# Patient Record
Sex: MALE | Race: WHITE | NOT HISPANIC OR LATINO | Employment: OTHER | URBAN - NONMETROPOLITAN AREA
[De-identification: names, ages, dates, MRNs, and addresses within clinical notes are randomized per-mention and may not be internally consistent; named-entity substitution may affect disease eponyms.]

---

## 2017-11-06 ENCOUNTER — OFFICE VISIT (OUTPATIENT)
Dept: CARDIOLOGY | Facility: CLINIC | Age: 57
End: 2017-11-06

## 2017-11-06 ENCOUNTER — HOSPITAL ENCOUNTER (OUTPATIENT)
Dept: GENERAL RADIOLOGY | Facility: HOSPITAL | Age: 57
Discharge: HOME | End: 2017-11-06
Attending: INTERNAL MEDICINE | Admitting: INTERNAL MEDICINE

## 2017-11-06 ENCOUNTER — APPOINTMENT (OUTPATIENT)
Dept: LAB | Facility: HOSPITAL | Age: 57
End: 2017-11-06

## 2017-11-06 VITALS
OXYGEN SATURATION: 98 % | DIASTOLIC BLOOD PRESSURE: 84 MMHG | WEIGHT: 149 LBS | HEART RATE: 64 BPM | SYSTOLIC BLOOD PRESSURE: 108 MMHG | HEIGHT: 70 IN | BODY MASS INDEX: 21.33 KG/M2

## 2017-11-06 DIAGNOSIS — I51.7 CARDIOMEGALY: ICD-10-CM

## 2017-11-06 DIAGNOSIS — R79.9 ABNORMAL FINDING OF BLOOD CHEMISTRY: ICD-10-CM

## 2017-11-06 DIAGNOSIS — I48.0 PAROXYSMAL ATRIAL FIBRILLATION (HCC): Primary | ICD-10-CM

## 2017-11-06 DIAGNOSIS — R07.2 PRECORDIAL PAIN: ICD-10-CM

## 2017-11-06 DIAGNOSIS — R06.09 DYSPNEA ON EXERTION: ICD-10-CM

## 2017-11-06 DIAGNOSIS — Z13.1 DIABETES MELLITUS SCREENING: ICD-10-CM

## 2017-11-06 DIAGNOSIS — R09.89 BRUIT: ICD-10-CM

## 2017-11-06 DIAGNOSIS — Z13.220 SCREENING FOR HYPERLIPIDEMIA: ICD-10-CM

## 2017-11-06 DIAGNOSIS — Z86.79 PERSONAL HISTORY OF ATRIAL FIBRILLATION: Primary | ICD-10-CM

## 2017-11-06 LAB
D-DIMER, QUANTITATIVE (MAD,POW, STR): 1130 NG/ML (FEU) (ref 0–470)
DEPRECATED RDW RBC AUTO: 41.5 FL (ref 35.1–43.9)
ERYTHROCYTE [DISTWIDTH] IN BLOOD BY AUTOMATED COUNT: 14.3 % (ref 11.5–14.5)
HBA1C MFR BLD: 5.7 % (ref 4–5.6)
HCT VFR BLD AUTO: 47 % (ref 39–49)
HGB BLD-MCNC: 15.5 G/DL (ref 13.7–17.3)
MCH RBC QN AUTO: 26.4 PG (ref 26.5–34)
MCHC RBC AUTO-ENTMCNC: 33 G/DL (ref 31.5–36.3)
MCV RBC AUTO: 80.1 FL (ref 80–98)
NT-PROBNP SERPL-MCNC: 322 PG/ML (ref 0–900)
PLATELET # BLD AUTO: 193 10*3/MM3 (ref 150–450)
PMV BLD AUTO: 9.1 FL (ref 8–12)
RBC # BLD AUTO: 5.87 10*6/MM3 (ref 4.37–5.74)
WBC NRBC COR # BLD: 6.43 10*3/MM3 (ref 3.2–9.8)

## 2017-11-06 PROCEDURE — 99202 OFFICE O/P NEW SF 15 MIN: CPT | Performed by: INTERNAL MEDICINE

## 2017-11-06 PROCEDURE — 85379 FIBRIN DEGRADATION QUANT: CPT | Performed by: INTERNAL MEDICINE

## 2017-11-06 PROCEDURE — 71020 HC CHEST PA AND LATERAL: CPT

## 2017-11-06 PROCEDURE — 83036 HEMOGLOBIN GLYCOSYLATED A1C: CPT | Performed by: INTERNAL MEDICINE

## 2017-11-06 PROCEDURE — 85027 COMPLETE CBC AUTOMATED: CPT | Performed by: INTERNAL MEDICINE

## 2017-11-06 PROCEDURE — 93000 ELECTROCARDIOGRAM COMPLETE: CPT | Performed by: INTERNAL MEDICINE

## 2017-11-06 PROCEDURE — 36415 COLL VENOUS BLD VENIPUNCTURE: CPT | Performed by: INTERNAL MEDICINE

## 2017-11-06 PROCEDURE — 83880 ASSAY OF NATRIURETIC PEPTIDE: CPT | Performed by: INTERNAL MEDICINE

## 2017-11-06 NOTE — PATIENT INSTRUCTIONS
Pharmacologic Stress Electrocardiogram  A pharmacologic stress electrocardiogram is a heart (cardiac) test that uses nuclear imaging to evaluate the blood supply to your heart. This test may also be called a pharmacologic stress electrocardiography. Pharmacologic means that a medicine is used to increase your heart rate and blood pressure.   This stress test is done to find areas of poor blood flow to the heart by determining the extent of coronary artery disease (CAD). Some people exercise on a treadmill, which naturally increases the blood flow to the heart. For those people unable to exercise on a treadmill, a medicine is used. This medicine stimulates your heart and will cause your heart to beat harder and more quickly, as if you were exercising.   Pharmacologic stress tests can help determine:  · The adequacy of blood flow to your heart during increased levels of activity in order to clear you for discharge home.  · The extent of coronary artery blockage caused by CAD.  · Your prognosis if you have suffered a heart attack.  · The effectiveness of cardiac procedures done, such as an angioplasty, which can increase the circulation in your coronary arteries.  · Causes of chest pain or pressure.  LET YOUR HEALTH CARE PROVIDER KNOW ABOUT:  · Any allergies you have.  · All medicines you are taking, including vitamins, herbs, eye drops, creams, and over-the-counter medicines.  · Previous problems you or members of your family have had with the use of anesthetics.  · Any blood disorders you have.  · Previous surgeries you have had.  · Medical conditions you have.  · Possibility of pregnancy, if this applies.  · If you are currently breastfeeding.  RISKS AND COMPLICATIONS  Generally, this is a safe procedure. However, as with any procedure, complications can occur. Possible complications include:  · You develop pain or pressure in the following areas:    Chest.    Jaw or neck.    Between your shoulder blades.     Radiating down your left arm.  · Headache.  · Dizziness or light-headedness.  · Shortness of breath.  · Increased or irregular heartbeat.  · Low blood pressure.  · Nausea or vomiting.  · Flushing.  · Redness going up the arm and slight pain during injection of medicine.  · Heart attack (rare).  BEFORE THE PROCEDURE   · Avoid all forms of caffeine for 24 hours before your test or as directed by your health care provider. This includes coffee, tea (even decaffeinated tea), caffeinated sodas, chocolate, cocoa, and certain pain medicines.  · Follow your health care provider's instructions regarding eating and drinking before the test.  · Take your medicines as directed at regular times with water unless instructed otherwise. Exceptions may include:    If you have diabetes, ask how you are to take your insulin or pills. It is common to adjust insulin dosing the morning of the test.    If you are taking beta-blocker medicines, it is important to talk to your health care provider about these medicines well before the date of your test. Taking beta-blocker medicines may interfere with the test. In some cases, these medicines need to be changed or stopped 24 hours or more before the test.    If you wear a nitroglycerin patch, it may need to be removed prior to the test. Ask your health care provider if the patch should be removed before the test.    If you use an inhaler for any breathing condition, bring it with you to the test.  · If you are an outpatient, bring a snack so you can eat right after the stress phase of the test.  · Do not smoke for 4 hours prior to the test or as directed by your health care provider.  · Do not apply lotions, powders, creams, or oils on your chest prior to the test.  · Wear comfortable shoes and clothing.  Let your health care provider know if you were unable to complete or follow the preparations for your test.  PROCEDURE   · Multiple patches (electrodes) will be put on your chest. If  needed, small areas of your chest may be shaved to get better contact with the electrodes. Once the electrodes are attached to your body, multiple wires will be attached to the electrodes, and your heart rate will be monitored.  · An IV access will be started. A nuclear trace (isotope) is given. The isotope may be given intravenously, or it may be swallowed. Nuclear refers to several types of radioactive isotopes, and the nuclear isotope lights up the arteries so that the nuclear images are clear. The isotope is absorbed by your body. This results in low radiation exposure.  · A resting nuclear image is taken to show how your heart functions at rest.  · A medicine is given through the IV access.  · A second scan is done about 1 hour after the medicine injection and determines how your heart functions under stress.  · During this stress phase, you will be connected to an electrocardiogram machine. Your blood pressure and oxygen levels will be monitored.  AFTER THE PROCEDURE   · Your heart rate and blood pressure will be monitored after the test.  · You may return to your normal schedule, including diet, activities, and medicines, unless your health care provider tells you otherwise.     This information is not intended to replace advice given to you by your health care provider. Make sure you discuss any questions you have with your health care provider.     Document Released: 05/06/2010 Document Revised: 12/23/2014 Document Reviewed: 08/25/2014  [employer] Interactive Patient Education ©2017 [employer]  Warfarin tablets  What is this medicine?  WARFARIN (WAR far in) is an anticoagulant. It is used to treat or prevent clots in the veins, arteries, lungs, or heart.  This medicine may be used for other purposes; ask your health care provider or pharmacist if you have questions.  COMMON BRAND NAME(S): Coumadin, Jantoven  What should I tell my health care provider before I take this medicine?  They need to know if you  have any of these conditions:  -alcoholism  -anemia  -bleeding disorders  -cancer  -diabetes  -heart disease  -high blood pressure  -history of bleeding in the gastrointestinal tract  -history of stroke or other brain injury or disease  -kidney or liver disease  -protein C deficiency  -protein S deficiency  -psychosis or dementia  -recent injury, recent or planned surgery or procedure  -an unusual or allergic reaction to warfarin, other medicines, foods, dyes, or preservatives  -pregnant or trying to get pregnant  -breast-feeding  How should I use this medicine?  Take this medicine by mouth with a glass of water. Follow the directions on the prescription label. You can take this medicine with or without food. Take your medicine at the same time each day. Do not take it more often than directed. Do not stop taking except on your doctor's advice. Stopping this medicine may increase your risk of a blood clot. Be sure to refill your prescription before you run out of medicine.  If your doctor or healthcare professional calls to change your dose, write down the dose and any other instructions. Always read the dose and instructions back to him or her to make sure you understand them. Tell your doctor or healthcare professional what strength of tablets you have on hand. Ask how many tablets you should take to equal your new dose. Write the date on the new instructions and keep them near your medicine. If you are told to stop taking your medicine until your next blood test, call your doctor or healthcare professional if you do not hear anything within 24 hours of the test to find out your new dose or when to restart your prior dose.  A special MedGuide will be given to you by the pharmacist with each prescription and refill. Be sure to read this information carefully each time.  Talk to your pediatrician regarding the use of this medicine in children. Special care may be needed.  Overdosage: If you think you have taken too  much of this medicine contact a poison control center or emergency room at once.  NOTE: This medicine is only for you. Do not share this medicine with others.  What if I miss a dose?  It is important not to miss a dose. If you miss a dose, call your healthcare provider. Take the dose as soon as possible on the same day. If it is almost time for your next dose, take only that dose. Do not take double or extra doses to make up for a missed dose.  What may interact with this medicine?  Do not take this medicine with any of the following medications:  -agents that prevent or dissolve blood clots  -aspirin or other salicylates  -danshen  -dextrothyroxine  -mifepristone  -Guy's Wort  -red yeast rice  This medicine may also interact with the following medications:  -acetaminophen  -agents that lower cholesterol  -alcohol  -allopurinol  -amiodarone  -antibiotics or medicines for treating bacterial, fungal or viral infections  -azathioprine  -barbiturate medicines for inducing sleep or treating seizures  -certain medicines for diabetes  -certain medicines for heart rhythm problems  -certain medicines for high blood pressure  -chloral hydrate  -cisapride  -disulfiram  -female hormones, including contraceptive or birth control pills  -general anesthetics  -herbal or dietary products like garlic, ginkgo, ginseng, green tea, or kava kava  -influenza virus vaccine  -male hormones  -medicines for mental depression or psychosis  -medicines for some types of cancer  -medicines for stomach problems  -methylphenidate  -NSAIDs, medicines for pain and inflammation, like ibuprofen or naproxen  -propoxyphene  -quinidine, quinine  -raloxifene  -seizure or epilepsy medicine like carbamazepine, phenytoin, and valproic acid  -steroids like cortisone and prednisone  -tamoxifen  -thyroid medicine  -tramadol  -vitamin c, vitamin e, and vitamin K  -zafirlukast  -zileuton  This list may not describe all possible interactions. Give your health  care provider a list of all the medicines, herbs, non-prescription drugs, or dietary supplements you use. Also tell them if you smoke, drink alcohol, or use illegal drugs. Some items may interact with your medicine.  What should I watch for while using this medicine?  Visit your doctor or health care professional for regular checks on your progress. You will need to have a blood test called a PT/INR regularly. The PT/INR blood test is done to make sure you are getting the right dose of this medicine. It is important to not miss your appointment for the blood tests. When you first start taking this medicine, these tests are done often. Once the correct dose is determined and you take your medicine properly, these tests can be done less often.  Notify your doctor or health care professional and seek emergency treatment if you develop breathing problems; changes in vision; chest pain; severe, sudden headache; pain, swelling, warmth in the leg; trouble speaking; sudden numbness or weakness of the face, arm or leg. These can be signs that your condition has gotten worse.  While you are taking this medicine, carry an identification card with your name, the name and dose of medicine(s) being used, and the name and phone number of your doctor or health care professional or person to contact in an emergency.  Do not start taking or stop taking any medicines or over-the-counter medicines except on the advice of your doctor or health care professional.  You should discuss your diet with your doctor or health care professional. Do not make major changes in your diet. Vitamin K can affect how well this medicine works. Many foods contain vitamin K. It is important to eat a consistent amount of foods with vitamin K. Other foods with vitamin K that you should eat in consistent amounts are asparagus, basil, beef or pork liver, black eyed peas, broccoli, brussel sprouts, cabbage, chickpeas, cucumber with peel, green onions, green  tea, okra, parsley, peas, thyme, and green leafy vegetables like beet greens, lyndon greens, endive, kale, mustard greens, spinach, turnip greens, watercress, or certain lettuces like green leaf or todd.  This medicine can cause birth defects or bleeding in an unborn child. Women of childbearing age should use effective birth control while taking this medicine. If a woman becomes pregnant while taking this medicine, she should discuss the potential risks and her options with her health care professional.  Avoid sports and activities that might cause injury while you are using this medicine. Severe falls or injuries can cause unseen bleeding. Be careful when using sharp tools or knives. Consider using an electric razor. Take special care brushing or flossing your teeth. Report any injuries, bruising, or red spots on the skin to your doctor or health care professional.  If you have an illness that causes vomiting, diarrhea, or fever for more than a few days, contact your doctor. Also check with your doctor if you are unable to eat for several days. These problems can change the effect of this medicine.  Even after you stop taking this medicine, it takes several days before your body recovers its normal ability to clot blood. Ask your doctor or health care professional how long you need to be careful. If you are going to have surgery or dental work, tell your doctor or health care professional that you have been taking this medicine.  What side effects may I notice from receiving this medicine?  Side effects that you should report to your doctor or health care professional as soon as possible:  -allergic reactions like skin rash, itching or hives, swelling of the face, lips, or tongue  -breathing problems  -chest pain  -dizziness  -headache  -heavy menstrual bleeding or vaginal bleeding  -pain in the lower back or side  -painful, blue or purple toes  -painful skin ulcers that do not go away  -signs and symptoms of  bleeding such as bloody or black, tarry stools; red or dark-brown urine; spitting up blood or brown material that looks like coffee grounds; red spots on the skin; unusual bruising or bleeding from the eye, gums, or nose  -stomach pain  -unusually weak or tired  Side effects that usually do not require medical attention (report to your doctor or health care professional if they continue or are bothersome):  -diarrhea  -hair loss  This list may not describe all possible side effects. Call your doctor for medical advice about side effects. You may report side effects to FDA at 8-060-NCT-1643.  Where should I keep my medicine?  Keep out of the reach of children.  Store at room temperature between 15 and 30 degrees C (59 and 86 degrees F). Protect from light. Throw away any unused medicine after the expiration date. Do not flush down the toilet.  NOTE: This sheet is a summary. It may not cover all possible information. If you have questions about this medicine, talk to your doctor, pharmacist, or health care provider.     © 2017, [employer]. (2016-10-18 14:36:10)  Atrial Fibrillation  Atrial fibrillation is a type of irregular or rapid heartbeat (arrhythmia). In atrial fibrillation, the heart quivers continuously in a chaotic pattern. This occurs when parts of the heart receive disorganized signals that make the heart unable to pump blood normally. This can increase the risk for stroke, heart failure, and other heart-related conditions. There are different types of atrial fibrillation, including:  · Paroxysmal atrial fibrillation. This type starts suddenly, and it usually stops on its own shortly after it starts.  · Persistent atrial fibrillation. This type often lasts longer than a week. It may stop on its own or with treatment.  · Long-lasting persistent atrial fibrillation. This type lasts longer than 12 months.  · Permanent atrial fibrillation. This type does not go away.  Talk with your health care  provider to learn about the type of atrial fibrillation that you have.  CAUSES  This condition is caused by some heart-related conditions or procedures, including:  · A heart attack.  · Coronary artery disease.  · Heart failure.  · Heart valve conditions.  · High blood pressure.  · Inflammation of the sac that surrounds the heart (pericarditis).  · Heart surgery.  · Certain heart rhythm disorders, such as Wise-Parkinson-White syndrome.  Other causes include:  · Pneumonia.  · Obstructive sleep apnea.  · Blockage of an artery in the lungs (pulmonary embolism, or PE).  · Lung cancer.  · Chronic lung disease.  · Thyroid problems, especially if the thyroid is overactive (hyperthyroidism).  · Caffeine.  · Excessive alcohol use or illegal drug use.  · Use of some medicines, including certain decongestants and diet pills.  Sometimes, the cause cannot be found.  RISK FACTORS  This condition is more likely to develop in:  · People who are older in age.  · People who smoke.  · People who have diabetes mellitus.  · People who are overweight (obese).  · Athletes who exercise vigorously.  SYMPTOMS  Symptoms of this condition include:  · A feeling that your heart is beating rapidly or irregularly.  · A feeling of discomfort or pain in your chest.  · Shortness of breath.  · Sudden light-headedness or weakness.  · Getting tired easily during exercise.  In some cases, there are no symptoms.  DIAGNOSIS  Your health care provider may be able to detect atrial fibrillation when taking your pulse. If detected, this condition may be diagnosed with:  · An electrocardiogram (ECG).  · A Holter monitor test that records your heartbeat patterns over a 24-hour period.  · Transthoracic echocardiogram (TTE) to evaluate how blood flows through your heart.  · Transesophageal echocardiogram (AVRIL) to view more detailed images of your heart.  · A stress test.  · Imaging tests, such as a CT scan or chest X-ray.  · Blood tests.  TREATMENT  The main goals  of treatment are to prevent blood clots from forming and to keep your heart beating at a normal rate and rhythm. The type of treatment that you receive depends on many factors, such as your underlying medical conditions and how you feel when you are experiencing atrial fibrillation.  This condition may be treated with:  · Medicine to slow down the heart rate, bring the heart's rhythm back to normal, or prevent clots from forming.  · Electrical cardioversion. This is a procedure that resets your heart's rhythm by delivering a controlled, low-energy shock to the heart through your skin.  · Different types of ablation, such as catheter ablation, catheter ablation with pacemaker, or surgical ablation. These procedures destroy the heart tissues that send abnormal signals. When the pacemaker is used, it is placed under your skin to help your heart beat in a regular rhythm.  HOME CARE INSTRUCTIONS  · Take over-the counter and prescription medicines only as told by your health care provider.  · If your health care provider prescribed a blood-thinning medicine (anticoagulant), take it exactly as told. Taking too much blood-thinning medicine can cause bleeding. If you do not take enough blood-thinning medicine, you will not have the protection that you need against stroke and other problems.  · Do not use tobacco products, including cigarettes, chewing tobacco, and e-cigarettes. If you need help quitting, ask your health care provider.  · If you have obstructive sleep apnea, manage your condition as told by your health care provider.  · Do not drink alcohol.  · Do not drink beverages that contain caffeine, such as coffee, soda, and tea.  · Maintain a healthy weight. Do not use diet pills unless your health care provider approves. Diet pills may make heart problems worse.  · Follow diet instructions as told by your health care provider.  · Exercise regularly as told by your health care provider.  · Keep all follow-up visits as  told by your health care provider. This is important.  PREVENTION  · Avoid drinking beverages that contain caffeine or alcohol.  · Avoid certain medicines, especially medicines that are used for breathing problems.  · Avoid certain herbs and herbal medicines, such as those that contain ephedra or ginseng.  · Do not use illegal drugs, such as cocaine and amphetamines.  · Do not smoke.  · Manage your high blood pressure.  SEEK MEDICAL CARE IF:  · You notice a change in the rate, rhythm, or strength of your heartbeat.  · You are taking an anticoagulant and you notice increased bruising.  · You tire more easily when you exercise or exert yourself.  SEEK IMMEDIATE MEDICAL CARE IF:  · You have chest pain, abdominal pain, sweating, or weakness.  · You feel nauseous.  · You notice blood in your vomit, bowel movement, or urine.  · You have shortness of breath.  · You suddenly have swollen feet and ankles.  · You feel dizzy.  · You have sudden weakness or numbness of the face, arm, or leg, especially on one side of the body.  · You have trouble speaking, trouble understanding, or both (aphasia).  · Your face or your eyelid droops on one side.  These symptoms may represent a serious problem that is an emergency. Do not wait to see if the symptoms will go away. Get medical help right away. Call your local emergency services (911 in the [place] Do not drive yourself to the hospital.     This information is not intended to replace advice given to you by your health care provider. Make sure you discuss any questions you have with your health care provider.     Document Released: 12/18/2006 Document Revised: 09/07/2016 Document Reviewed: 04/13/2016  [employer] Interactive Patient Education ©2017 [employer]  Steps to Quit Smoking   Smoking tobacco can be harmful to your health and can affect almost every organ in your body. Smoking puts you, and those around you, at risk for developing many serious chronic diseases. Quitting smoking  is difficult, but it is one of the best things that you can do for your health. It is never too late to quit.  WHAT ARE THE BENEFITS OF QUITTING SMOKING?  When you quit smoking, you lower your risk of developing serious diseases and conditions, such as:  · Lung cancer or lung disease, such as COPD.  · Heart disease.  · Stroke.  · Heart attack.  · Infertility.  · Osteoporosis and bone fractures.  Additionally, symptoms such as coughing, wheezing, and shortness of breath may get better when you quit. You may also find that you get sick less often because your body is stronger at fighting off colds and infections. If you are pregnant, quitting smoking can help to reduce your chances of having a baby of low birth weight.  HOW DO I GET READY TO QUIT?  When you decide to quit smoking, create a plan to make sure that you are successful. Before you quit:  · Pick a date to quit. Set a date within the next two weeks to give you time to prepare.  · Write down the reasons why you are quitting. Keep this list in places where you will see it often, such as on your bathroom mirror or in your car or wallet.  · Identify the people, places, things, and activities that make you want to smoke (triggers) and avoid them. Make sure to take these actions:    Throw away all cigarettes at home, at work, and in your car.    Throw away smoking accessories, such as ashtrays and lighters.    Clean your car and make sure to empty the ashtray.    Clean your home, including curtains and carpets.  · Tell your family, friends, and coworkers that you are quitting. Support from your loved ones can make quitting easier.  · Talk with your health care provider about your options for quitting smoking.  · Find out what treatment options are covered by your health insurance.  WHAT STRATEGIES CAN I USE TO QUIT SMOKING?   Talk with your healthcare provider about different strategies to quit smoking. Some strategies include:  · Quitting smoking altogether  "instead of gradually lessening how much you smoke over a period of time. Research shows that quitting \"cold turkey\" is more successful than gradually quitting.  · Attending in-person counseling to help you build problem-solving skills. You are more likely to have success in quitting if you attend several counseling sessions. Even short sessions of 10 minutes can be effective.  · Finding resources and support systems that can help you to quit smoking and remain smoke-free after you quit. These resources are most helpful when you use them often. They can include:    Online chats with a counselor.    Telephone quitlines.    Printed self-help materials.    Support groups or group counseling.    Text messaging programs.    Mobile phone applications.  · Taking medicines to help you quit smoking. (If you are pregnant or breastfeeding, talk with your health care provider first.) Some medicines contain nicotine and some do not. Both types of medicines help with cravings, but the medicines that include nicotine help to relieve withdrawal symptoms. Your health care provider may recommend:    Nicotine patches, gum, or lozenges.    Nicotine inhalers or sprays.    Non-nicotine medicine that is taken by mouth.  Talk with your health care provider about combining strategies, such as taking medicines while you are also receiving in-person counseling. Using these two strategies together makes you more likely to succeed in quitting than if you used either strategy on its own.  If you are pregnant or breastfeeding, talk with your health care provider about finding counseling or other support strategies to quit smoking. Do not take medicine to help you quit smoking unless told to do so by your health care provider.  WHAT THINGS CAN I DO TO MAKE IT EASIER TO QUIT?  Quitting smoking might feel overwhelming at first, but there is a lot that you can do to make it easier. Take these important actions:  · Reach out to your family and friends " and ask that they support and encourage you during this time. Call telephone quitlines, reach out to support groups, or work with a counselor for support.  · Ask people who smoke to avoid smoking around you.  · Avoid places that trigger you to smoke, such as bars, parties, or smoke-break areas at work.  · Spend time around people who do not smoke.  · Lessen stress in your life, because stress can be a smoking trigger for some people. To lessen stress, try:    Exercising regularly.    Deep-breathing exercises.    Yoga.    Meditating.    Performing a body scan. This involves closing your eyes, scanning your body from head to toe, and noticing which parts of your body are particularly tense. Purposefully relax the muscles in those areas.  · Download or purchase mobile phone or tablet apps (applications) that can help you stick to your quit plan by providing reminders, tips, and encouragement. There are many free apps, such as QuitGuide from the CDC (Centers for Disease Control and Prevention). You can find other support for quitting smoking (smoking cessation) through smokefree.gov and other websites.  HOW WILL I FEEL WHEN I QUIT SMOKING?  Within the first 24 hours of quitting smoking, you may start to feel some withdrawal symptoms. These symptoms are usually most noticeable 2-3 days after quitting, but they usually do not last beyond 2-3 weeks. Changes or symptoms that you might experience include:  · Mood swings.  · Restlessness, anxiety, or irritation.  · Difficulty concentrating.  · Dizziness.  · Strong cravings for sugary foods in addition to nicotine.  · Mild weight gain.  · Constipation.  · Nausea.  · Coughing or a sore throat.  · Changes in how your medicines work in your body.  · A depressed mood.  · Difficulty sleeping (insomnia).  After the first 2-3 weeks of quitting, you may start to notice more positive results, such as:  · Improved sense of smell and taste.  · Decreased coughing and sore  throat.  · Slower heart rate.  · Lower blood pressure.  · Clearer skin.  · The ability to breathe more easily.  · Fewer sick days.  Quitting smoking is very challenging for most people. Do not get discouraged if you are not successful the first time. Some people need to make many attempts to quit before they achieve long-term success. Do your best to stick to your quit plan, and talk with your health care provider if you have any questions or concerns.     This information is not intended to replace advice given to you by your health care provider. Make sure you discuss any questions you have with your health care provider.     Document Released: 12/12/2002 Document Revised: 05/03/2016 Document Reviewed: 05/03/2016  [employer] Interactive Patient Education ©2017 [employer]

## 2017-11-06 NOTE — PROGRESS NOTES
"   Cardiovascular Medicine      Juan Pablo Samuels M.D., Ph.D., [facility]         Thank you for asking me to see Antonio Trinidad for AF.    History of Present Illness  This is a 56 y.o. male with:    1. Presumed NVAF  A. Off anticoagulation  2. Prior h/o VTE: PE  A. July 2016  3. Smoker  4. Possible cardiomegaly  5. RV dilation    PE  He was diagnosed with a PE in July of 2016. This was at [facility] and then he was transferred to [place]. He was started on Warfarin. He was told he would need anticoagulation for \"life.\" He denies being told he had a bleeding disorder.     AF  The patient presents as a consult for atrial fibrillation.  Of note, he has seen cardiology in the past at another hospital.  He does not have his records.  He denies being told that he has valvular heart disease.  He denies prior CVA or TIA.  He apparently was on Coumadin in the past, but he ran out.  He never sought refills.   He denies increased cardiac awareness.    Chest Pain/cardiomegaly  The patient does endorse a chest pain syndrome.  This is been primarily non-exertional.  He was seen at his PCPs office.  He refused an EKG at that time because he was \"not hurting.\"  He denies a prior history of MI, CAD or CHF.  He does tell me that he has a history of cardiomegaly, but he denies a history of CHF.  He does not know his LVEF.  He tells me he's never had an ischemia evaluation.      Review of Systems - History obtained from chart review and the patient  General ROS: negative  Cardiovascular ROS: positive for - chest pain.  All other systems were reviewed and were negative.    family history is not on file.     reports that he has been smoking.  He has been smoking about 1.00 pack per day. He does not have any smokeless tobacco history on file. He reports that he does not drink alcohol.    Allergies not on file    No current outpatient prescriptions on file.    Physical Exam:  Vitals:    11/06/17 0916   BP: 108/84   Pulse:    SpO2:  "     Body mass index is 21.38 kg/(m^2).    GEN: alert, appears stated age and cooperative  Body Habitus: well-nourished  Neuro: CN II-XII grossly intact.   HEENT: Head: Normocephalic, no lesions, without obvious abnormality.  Neck / Thyroid: Supple, no masses, nodes, nodules or enlargement. No arcus senilis, xanthelasma or xanthomas. PERRL. Normal external ears. No drainage. No thyromegaly. Neck supple. No LAD. Trachea midline. Nose, normal.  JVP: 6 cm of water at 45 degrees HJR: absent      Carotid:  Upstroke: easily palpated bilaterally Volume: Normal.    Carotid Bruit:  Present Subclavian Bruit: Not present.    Lymph: No overt LAD.   Back: Normal.  Chest:  Normal Excursion: Good    I:E: Good  Pulmonary:clear to auscultation, no wheezes, rales or rhonchi, symmetric air entry. Equal chest excursion. Chest physical exam is normal. No tenderness.        Precordium:  No palpable heaves or thrusts. P2 is not palpable.   [place]:  normal size and placement Palpable S4: Not present.   Heart rate: normal  Heart Rhythm: regular     Heart Sounds: S1: normal  S2: normal  S3: absent   S4: absent  Opening Snap: absent  A2-OS:  N/A  Pericardial rub: absent    Ejection click: None      Murmurs: Systolic: none  Diastolic: none  Abdomen: Deferred   Extremity: no edema, cyanosis, peripheral pulses 2+ and symmetric      DATA REVIEWED:     EKG: normal EKG, normal sinus rhythm, unchanged from previous tracings, Sinus, PSVCs and RBBB.        PCP: I did review the notes that were sent from the PCP.  The patient established care for a history of PE, chest pain syndrome, possible cardiomegaly and a history of presumed NVAF.  He apparently was on anticoagulation primarily for his history of PE, for which there are no records available.          This document has been electronically signed by Juan Pablo Samuels MD PhD on November 6, 2017 9:24 AM        Assessment/Plan      1. Chest pain syndrome/Dyspnea. The pt has demonstrated a chest pain  syndrome with pain complaints that are best characterized as pain atypical.   The patient is Moderate Risk.  An ischemia evaluation is indicated. The patient is not able to exercise. Reason for inability to exercise: orthopedic condition(s): chronic back pain.   -Risks/Benefits discussed.   -A hand out was provided on the type of stress test and how to handle additional chest pain complaints. Questions answered.   -I have asked the patient to call 911 or be seen in the ED for further CP complaints.   -Will plan on further evaluation with Lexiscan MPS, TTE and PA/LAT CXR.  -Amb ref Pulm; PFTs, 6MWT    2. paroxysmal - 7 days or less Atrial fibrillation. The patient is currently in NSR with a well controlled ventricular rate.  Does not currently have an indication for anticoagulation for his atrial fibrillation.  -TTE  -ZIO    3.  Cardiomegaly of unclear etiology.  No signs of CHF on exam today.  -TTE    4. Cardiac Risk Assessment and need for statin therapy:  His 10-year risk of ASCVD was calculated today using his most recent lipid profile.  This was calculated at 7.5%.  LDL is acceptable.  -He will follow-up with his primary care provider to discuss possible therapy at some point in the future  -Smoking cessation was emphasized  -Aspirin initiation was not recommended due to his risk being less than 10%.  -Recommend PCP performed yearly evaluation of his risk    5. Tobacco status: Current smoker.  I advised patient to quit, and offered support.  Educational material distributed.  less than 3 minutes    6. AAA screening. The patient meets criteria for screening due to: At age 65    7. PE.  I do not have details on the records. He says it was one event.   -D-dimer  -Records from [facility]    Plan for follow-up:  Three months          This document has been electronically signed by Juan Pablo Samuels MD PhD on November 6, 2017 9:26 AM

## 2017-11-07 ENCOUNTER — TELEPHONE (OUTPATIENT)
Dept: CARDIOLOGY | Facility: CLINIC | Age: 57
End: 2017-11-07

## 2017-11-07 NOTE — TELEPHONE ENCOUNTER
----- Message from Juan Pablo Garcia MD PhD sent at 11/7/2017  1:15 PM CST -----  Contact: [phone]  Yes, overall, they look pretty good.  Please just let him know that the lab I sent to look at his risk for future clots is elevated.  We will discuss at next appointment.  ----- Message -----     From: Fatuma Jimenez MA     Sent: 11/7/2017   1:01 PM       To: Juan Pablo Garcia MD PhD        ----- Message -----     From: Nikki Madsen     Sent: 11/7/2017  12:40 PM       To: Fatuma Jimenez MA    Patient's wife called and is wanting to know if you have received the patient's lab results yet.  Her phone number is [phone].    Per dr. garcia labs looked pretty good overall. Lab done for risk of future clots was elevated and will be discussed with the patient in greater detail at his next appt.

## 2017-12-01 ENCOUNTER — APPOINTMENT (OUTPATIENT)
Dept: NUCLEAR MEDICINE | Facility: HOSPITAL | Age: 57
End: 2017-12-01
Attending: INTERNAL MEDICINE

## 2017-12-01 ENCOUNTER — APPOINTMENT (OUTPATIENT)
Dept: CARDIOLOGY | Facility: HOSPITAL | Age: 57
End: 2017-12-01
Attending: INTERNAL MEDICINE

## 2018-03-26 DIAGNOSIS — I48.0 PAF (PAROXYSMAL ATRIAL FIBRILLATION) (HCC): Primary | ICD-10-CM
